# Patient Record
(demographics unavailable — no encounter records)

---

## 2025-02-20 NOTE — REASON FOR VISIT
[FreeTextEntry1] : This is a 48 -year-old female who presents here for cardiac reevaluation in anticipation of a total left knee replacement surgery.  This is tentatively scheduled to be done March 18th at [facility] with Dr. Randal Ramirez.    Her history includes: 1.  Hypertension.  2.  Palpitations.  3.  Morbid obesity  s/p gastric sleeve 8/3/21  Tolerated bariatric sx well and denies any new cardiac issues. Down 90 lbs since. Previously exercises every day before injuring her left knee with the use of her treadmill and resistance training.  Home blood pressure is being monitored only in the afternoons.  Diastolics occasionally 90 mmHg. Has been off losartan since the last couple of visits.  She denies any new symptoms of chest pain.  No shortness of breath, no PND, orthopnea, or edema. She stopped exercise. .  Atorvastatin still being used for hyperlipidemia. She denies tobacco use or diabetes.

## 2025-02-20 NOTE — HISTORY OF PRESENT ILLNESS
Patient arrives by private car for evaluation of chest pain and bilateral arm pain that started about 0300 this morning.  Patient is a dialysis patient.  Denies chest pain at this time.        [FreeTextEntry1] : Cardiac review of systems is entirely unremarkable.

## 2025-02-20 NOTE — ASSESSMENT
[FreeTextEntry1] :  ECG:  Normal sinus rhythm at 65 bpm.  Delayed R-wave progression.  Essentially unchanged.   Lab Data    ------1/6/20--- 8/14/19----10/5/21---2/18/22--9/9/22 Chol---184------143---------118--------144------150 LDL---104--------74---------- 66---------71-------72 HDL----65--------58-----------43---------58-------64 Trg:----75----------------------45---------70-------61  Exercise stress test 6/7/19: 9 minutes 45 seconds (11 Mets) Peak  (90% maximum predicted) No ischemic ECG changes or symptoms This was negative for ischemia.  Echocardiogram 6/7/19: Normal cardiac chamber sizes and function. No significant valvular disease. Comparison prior study left atrium no longer enlarged  Impression: 1. S/P gastric sleeve with 90  lb weight loss and now preop for left knee replacement.  2. HTN: Off losartan  3.  More energetic and improvement in her joint discomfort. 4.  Previously seen QTC prolongation has resolved. 5.  Hyperlipidemia appears much improved.  Plan: 1.  No cardiac contraindication to proceeding with left knee replacement.    2.  She will remain off atorvastatin and reassess.  Patient knows to call us regarding the blood work  3.  6 months follow-up if remains otherwise stable  
[FreeTextEntry1] :  ECG:  Normal sinus rhythm at 65 bpm.  Delayed R-wave progression.  Essentially unchanged.   Lab Data    ------1/6/20--- 8/14/19----10/5/21---2/18/22--9/9/22 Chol---184------143---------118--------144------150 LDL---104--------74---------- 66---------71-------72 HDL----65--------58-----------43---------58-------64 Trg:----75----------------------45---------70-------61  Exercise stress test 6/7/19: 9 minutes 45 seconds (11 Mets) Peak  (90% maximum predicted) No ischemic ECG changes or symptoms This was negative for ischemia.  Echocardiogram 6/7/19: Normal cardiac chamber sizes and function. No significant valvular disease. Comparison prior study left atrium no longer enlarged  Impression: 1. S/P gastric sleeve with 90  lb weight loss and now preop for left knee replacement.  2. HTN: Off losartan  3.  More energetic and improvement in her joint discomfort. 4.  Previously seen QTC prolongation has resolved. 5.  Hyperlipidemia appears much improved.  Plan: 1.  No cardiac contraindication to proceeding with left knee replacement.    2.  She will remain off atorvastatin and reassess.  Patient knows to call us regarding the blood work  3.  6 months follow-up if remains otherwise stable  
3 (mild pain)

## 2025-02-20 NOTE — REASON FOR VISIT
[FreeTextEntry1] : This is a 48 -year-old female who presents here for cardiac reevaluation in anticipation of a total left knee replacement surgery.  This is tentatively scheduled to be done March 18th at [facility] with Dr. Randal Ramirez.    Her history includes: 1.  Hypertension.  2.  Palpitations.  3.  Morbid obesity  s/p gastric sleeve 8/3/21  Tolerated bariatric sx well and denies any new cardiac issues. Down 90 lbs since. Previously exercises every day before injuring her left knee with the use of her treadmill and resistance training.  Home blood pressure is being monitored only in the afternoons.  Diastolics occasionally 90 mmHg. Has been off losartan since the last couple of visits.  She denies any new symptoms of chest pain.  No shortness of breath, no PND, orthopnea, or edema. She stopped exercise. .  Atorvastatin still being used for hyperlipidemia. She denies tobacco use or diabetes.

## 2025-02-20 NOTE — PHYSICAL EXAM
[FreeTextEntry1] :                    Well appearing  female with no obvious deformities or distress.\par  \par  Eyes: \par  No conjunctival injection and no xanthelasmas.\par  HEENT: \par  Normocephalic.Normal oral mucosa. No pallor or cyanosis\par  Neck: \par  No jugular venous distension. with normal A and V wave forms. No palpable adenopathy.\par  Cardiovascular: \par  Normal rate and rhythm with normal S1, S2 and a grade 1/6 systolic murmur. Distal arterial pulses are normal. No significant peripheral edema.\par  Pulmonary: \par  Lungs are clear to auscultation and percussion. Normal respiratory pattern without any accessory muscle use\par  Abdomen: \par  Soft, non-tender ; no palpable organomegaly or masses.\par  Extremities:\par  No digital clubbing, cyanosis or ischemic changes.\par  Skin: \par  No skin lesions, rashes, ulcers or xanthomas.\par  Psychiatric: \par  Alert and oriented to person, place and time. Appropriate mood and affect.